# Patient Record
Sex: FEMALE | Race: OTHER | Employment: UNEMPLOYED | URBAN - METROPOLITAN AREA
[De-identification: names, ages, dates, MRNs, and addresses within clinical notes are randomized per-mention and may not be internally consistent; named-entity substitution may affect disease eponyms.]

---

## 2024-06-08 ENCOUNTER — HOSPITAL ENCOUNTER (EMERGENCY)
Facility: HOSPITAL | Age: 64
Discharge: LEFT BEFORE DISCHARGE | End: 2024-06-08
Attending: EMERGENCY MEDICINE
Payer: COMMERCIAL

## 2024-06-08 ENCOUNTER — APPOINTMENT (EMERGENCY)
Dept: RADIOLOGY | Facility: HOSPITAL | Age: 64
End: 2024-06-08
Payer: COMMERCIAL

## 2024-06-08 VITALS
SYSTOLIC BLOOD PRESSURE: 170 MMHG | HEART RATE: 83 BPM | WEIGHT: 208 LBS | OXYGEN SATURATION: 97 % | HEIGHT: 64 IN | DIASTOLIC BLOOD PRESSURE: 99 MMHG | BODY MASS INDEX: 35.51 KG/M2 | RESPIRATION RATE: 21 BRPM | TEMPERATURE: 98.7 F

## 2024-06-08 DIAGNOSIS — I47.10 SVT (SUPRAVENTRICULAR TACHYCARDIA): Primary | ICD-10-CM

## 2024-06-08 DIAGNOSIS — J02.9 PHARYNGITIS: ICD-10-CM

## 2024-06-08 LAB
2HR DELTA HS TROPONIN: 103 NG/L
ANION GAP SERPL CALCULATED.3IONS-SCNC: 10 MMOL/L (ref 4–13)
ATRIAL RATE: 214 BPM
ATRIAL RATE: 84 BPM
ATRIAL RATE: 89 BPM
ATRIAL RATE: 98 BPM
BASOPHILS # BLD AUTO: 0.02 THOUSANDS/ÂΜL (ref 0–0.1)
BASOPHILS NFR BLD AUTO: 0 % (ref 0–1)
BUN SERPL-MCNC: 15 MG/DL (ref 5–25)
CALCIUM SERPL-MCNC: 8.5 MG/DL (ref 8.4–10.2)
CARDIAC TROPONIN I PNL SERPL HS: 127 NG/L
CARDIAC TROPONIN I PNL SERPL HS: 24 NG/L
CHLORIDE SERPL-SCNC: 108 MMOL/L (ref 96–108)
CO2 SERPL-SCNC: 22 MMOL/L (ref 21–32)
CREAT SERPL-MCNC: 0.52 MG/DL (ref 0.6–1.3)
EOSINOPHIL # BLD AUTO: 0.2 THOUSAND/ÂΜL (ref 0–0.61)
EOSINOPHIL NFR BLD AUTO: 2 % (ref 0–6)
ERYTHROCYTE [DISTWIDTH] IN BLOOD BY AUTOMATED COUNT: 13.6 % (ref 11.6–15.1)
GFR SERPL CREATININE-BSD FRML MDRD: 101 ML/MIN/1.73SQ M
GLUCOSE SERPL-MCNC: 148 MG/DL (ref 65–140)
HCT VFR BLD AUTO: 44 % (ref 34.8–46.1)
HGB BLD-MCNC: 14.5 G/DL (ref 11.5–15.4)
IMM GRANULOCYTES # BLD AUTO: 0.02 THOUSAND/UL (ref 0–0.2)
IMM GRANULOCYTES NFR BLD AUTO: 0 % (ref 0–2)
LYMPHOCYTES # BLD AUTO: 2.51 THOUSANDS/ÂΜL (ref 0.6–4.47)
LYMPHOCYTES NFR BLD AUTO: 28 % (ref 14–44)
MCH RBC QN AUTO: 27.5 PG (ref 26.8–34.3)
MCHC RBC AUTO-ENTMCNC: 33 G/DL (ref 31.4–37.4)
MCV RBC AUTO: 84 FL (ref 82–98)
MONOCYTES # BLD AUTO: 0.57 THOUSAND/ÂΜL (ref 0.17–1.22)
MONOCYTES NFR BLD AUTO: 6 % (ref 4–12)
NEUTROPHILS # BLD AUTO: 5.66 THOUSANDS/ÂΜL (ref 1.85–7.62)
NEUTS SEG NFR BLD AUTO: 64 % (ref 43–75)
NRBC BLD AUTO-RTO: 0 /100 WBCS
P AXIS: 63 DEGREES
P AXIS: 63 DEGREES
P AXIS: 75 DEGREES
PLATELET # BLD AUTO: 253 THOUSANDS/UL (ref 149–390)
PMV BLD AUTO: 9.7 FL (ref 8.9–12.7)
POTASSIUM SERPL-SCNC: 3.8 MMOL/L (ref 3.5–5.3)
PR INTERVAL: 154 MS
PR INTERVAL: 174 MS
PR INTERVAL: 188 MS
QRS AXIS: -33 DEGREES
QRS AXIS: 10 DEGREES
QRS AXIS: 47 DEGREES
QRS AXIS: 5 DEGREES
QRSD INTERVAL: 72 MS
QRSD INTERVAL: 76 MS
QRSD INTERVAL: 76 MS
QRSD INTERVAL: 80 MS
QT INTERVAL: 260 MS
QT INTERVAL: 302 MS
QT INTERVAL: 376 MS
QT INTERVAL: 376 MS
QTC INTERVAL: 385 MS
QTC INTERVAL: 444 MS
QTC INTERVAL: 455 MS
QTC INTERVAL: 457 MS
RBC # BLD AUTO: 5.27 MILLION/UL (ref 3.81–5.12)
S PYO DNA THROAT QL NAA+PROBE: NOT DETECTED
SODIUM SERPL-SCNC: 140 MMOL/L (ref 135–147)
T WAVE AXIS: 146 DEGREES
T WAVE AXIS: 64 DEGREES
T WAVE AXIS: 67 DEGREES
T WAVE AXIS: 80 DEGREES
VENTRICULAR RATE: 184 BPM
VENTRICULAR RATE: 84 BPM
VENTRICULAR RATE: 89 BPM
VENTRICULAR RATE: 98 BPM
WBC # BLD AUTO: 8.98 THOUSAND/UL (ref 4.31–10.16)

## 2024-06-08 PROCEDURE — 36415 COLL VENOUS BLD VENIPUNCTURE: CPT | Performed by: EMERGENCY MEDICINE

## 2024-06-08 PROCEDURE — 87651 STREP A DNA AMP PROBE: CPT | Performed by: EMERGENCY MEDICINE

## 2024-06-08 PROCEDURE — 99285 EMERGENCY DEPT VISIT HI MDM: CPT

## 2024-06-08 PROCEDURE — 93005 ELECTROCARDIOGRAM TRACING: CPT

## 2024-06-08 PROCEDURE — 99291 CRITICAL CARE FIRST HOUR: CPT | Performed by: EMERGENCY MEDICINE

## 2024-06-08 PROCEDURE — 85025 COMPLETE CBC W/AUTO DIFF WBC: CPT | Performed by: EMERGENCY MEDICINE

## 2024-06-08 PROCEDURE — 80048 BASIC METABOLIC PNL TOTAL CA: CPT | Performed by: EMERGENCY MEDICINE

## 2024-06-08 PROCEDURE — 71045 X-RAY EXAM CHEST 1 VIEW: CPT

## 2024-06-08 PROCEDURE — 84484 ASSAY OF TROPONIN QUANT: CPT | Performed by: EMERGENCY MEDICINE

## 2024-06-08 PROCEDURE — 96374 THER/PROPH/DIAG INJ IV PUSH: CPT

## 2024-06-08 RX ORDER — ADENOSINE 3 MG/ML
6 INJECTION INTRAVENOUS ONCE
Status: COMPLETED | OUTPATIENT
Start: 2024-06-08 | End: 2024-06-08

## 2024-06-08 RX ADMIN — DEXAMETHASONE SODIUM PHOSPHATE 10 MG: 10 INJECTION, SOLUTION INTRAMUSCULAR; INTRAVENOUS at 21:19

## 2024-06-08 RX ADMIN — ADENOSINE 6 MG: 3 INJECTION INTRAVENOUS at 19:39

## 2024-06-09 NOTE — DISCHARGE INSTRUCTIONS
Delfino presented to my emergency department with SVT.  Vagal maneuvers did not work to correct the SVT.  She had been in SVT for approximately 17 hours before presenting to the emergency department.    We were able to reverse her SVT with adenosine 6 mg.    Her initial troponin was 24, a repeat troponin was 127.  The rest of her lab work was normal.  Patient was advised to stay in the hospital for cardiac evaluation, repeat troponins however needed to leave AGAINST MEDICAL ADVICE to take care of a family member.    She was advised to proceed directly to a hospital if she has repeat palpitations, chest pain, or any cardiac symptoms.

## 2024-06-09 NOTE — ED PROVIDER NOTES
"History  Chief Complaint   Patient presents with    Palpitations     Pt arrived via wheelchair c/o waking up at 3 am with \" very fast heart beat.\" Palpitations resolved this morning at 7am and returned at 1730 while sitting in the car. Pt reports sob  -n/v -dizziness     63-year-old female presents with palpitations, she woke up at 3:00 this morning (17 hours ago) with palpitations and her heart beating fast.  This has happened to her multiple times in the past but usually self resolves, she has on occasion had to go to the hospital for medication to control her heart rate.    She denies chest pain, states she feels palpitations, feels fatigued, denies shortness of breath.    Presents in SVT, resistant to vagal movements.      Palpitations  Associated symptoms: no back pain, no chest pain, no dizziness, no nausea, no shortness of breath and no vomiting        None       Past Medical History:   Diagnosis Date    Diabetes mellitus (HCC)        No past surgical history on file.    No family history on file.  I have reviewed and agree with the history as documented.    E-Cigarette/Vaping     E-Cigarette/Vaping Substances          Review of Systems   Constitutional:  Positive for fatigue. Negative for chills and fever.   HENT:  Positive for sore throat. Negative for congestion and rhinorrhea.    Respiratory:  Negative for chest tightness and shortness of breath.    Cardiovascular:  Positive for palpitations. Negative for chest pain and leg swelling.   Gastrointestinal:  Negative for abdominal pain, nausea and vomiting.   Musculoskeletal:  Negative for back pain and neck pain.   Skin:  Negative for wound.   Neurological:  Negative for dizziness and headaches.       Physical Exam  Physical Exam  Vitals reviewed.   Constitutional:       General: She is not in acute distress.     Appearance: She is well-developed. She is not diaphoretic.   HENT:      Head: Normocephalic and atraumatic.      Mouth/Throat:      Mouth: Mucous " membranes are moist.      Pharynx: No oropharyngeal exudate or posterior oropharyngeal erythema.   Eyes:      General: No scleral icterus.        Right eye: No discharge.         Left eye: No discharge.      Extraocular Movements: EOM normal.      Conjunctiva/sclera: Conjunctivae normal.      Pupils: Pupils are equal, round, and reactive to light.   Neck:      Vascular: No JVD.   Cardiovascular:      Rate and Rhythm: Regular rhythm. Tachycardia present.   Pulmonary:      Effort: Pulmonary effort is normal. No respiratory distress.      Breath sounds: Normal breath sounds. No wheezing or rales.   Chest:      Chest wall: No tenderness.   Abdominal:      General: Bowel sounds are normal. There is no distension.      Palpations: Abdomen is soft.      Tenderness: There is no abdominal tenderness. There is no guarding.   Musculoskeletal:         General: No tenderness, deformity or edema. Normal range of motion.      Cervical back: Normal range of motion and neck supple.      Right lower leg: No edema.      Left lower leg: No edema.   Skin:     General: Skin is warm and dry.      Coloration: Skin is not pale.      Findings: No erythema or rash.   Neurological:      General: No focal deficit present.      Mental Status: She is alert and oriented to person, place, and time.      Cranial Nerves: No cranial nerve deficit.   Psychiatric:         Mood and Affect: Mood and affect normal.         Behavior: Behavior normal.         Vital Signs  ED Triage Vitals [06/08/24 1921]   Temperature Pulse Respirations Blood Pressure SpO2   98.7 °F (37.1 °C) (!) 188 19 141/99 98 %      Temp Source Heart Rate Source Patient Position - Orthostatic VS BP Location FiO2 (%)   Temporal Monitor Sitting Left arm --      Pain Score       --           Vitals:    06/08/24 1921 06/08/24 1941 06/08/24 2000 06/08/24 2100   BP: 141/99 (!) 171/96 151/87 163/96   Pulse: (!) 188 (!) 108 91 87   Patient Position - Orthostatic VS: Sitting Lying Sitting Sitting          Visual Acuity      ED Medications  Medications   adenosine (ADENOCARD) injection 6 mg (6 mg Intravenous Given 6/8/24 1939)   dexamethasone oral liquid 10 mg 1 mL (10 mg Oral Given 6/8/24 2119)       Diagnostic Studies  Results Reviewed       Procedure Component Value Units Date/Time    HS Troponin I 2hr [[id number]] Collected: 06/08/24 2121    Lab Status: In process Specimen: Blood from Arm, Left Updated: 06/08/24 2125    Strep A PCR [[id number]] Collected: 06/08/24 2118    Lab Status: In process Specimen: Throat Updated: 06/08/24 2122    Basic metabolic panel [[id number]]  (Abnormal) Collected: 06/08/24 2011    Lab Status: Final result Specimen: Blood from Arm, Left Updated: 06/08/24 2043     Sodium 140 mmol/L      Potassium 3.8 mmol/L      Chloride 108 mmol/L      CO2 22 mmol/L      ANION GAP 10 mmol/L      BUN 15 mg/dL      Creatinine 0.52 mg/dL      Glucose 148 mg/dL      Calcium 8.5 mg/dL      eGFR 101 ml/min/1.73sq m     Narrative:      [employer] guidelines for Chronic Kidney Disease (CKD):     Stage 1 with normal or high GFR (GFR > 90 mL/min/1.73 square meters)    Stage 2 Mild CKD (GFR = 60-89 mL/min/1.73 square meters)    Stage 3A Moderate CKD (GFR = 45-59 mL/min/1.73 square meters)    Stage 3B Moderate CKD (GFR = 30-44 mL/min/1.73 square meters)    Stage 4 Severe CKD (GFR = 15-29 mL/min/1.73 square meters)    Stage 5 End Stage CKD (GFR <15 mL/min/1.73 square meters)  Note: GFR calculation is accurate only with a steady state creatinine    HS Troponin 0hr (reflex protocol) [[id number]]  (Normal) Collected: 06/08/24 1936    Lab Status: Final result Specimen: Blood from Arm, Left Updated: 06/08/24 2007     hs TnI 0hr 24 ng/L     HS Troponin I 4hr [[id number]]     Lab Status: No result Specimen: Blood     CBC and differential [[id number]]  (Abnormal) Collected: 06/08/24 1936    Lab Status: Final result Specimen: Blood from Arm, Left Updated: 06/08/24 1941     WBC 8.98 Thousand/uL       RBC 5.27 Million/uL      Hemoglobin 14.5 g/dL      Hematocrit 44.0 %      MCV 84 fL      MCH 27.5 pg      MCHC 33.0 g/dL      RDW 13.6 %      MPV 9.7 fL      Platelets 253 Thousands/uL      nRBC 0 /100 WBCs      Segmented % 64 %      Immature Grans % 0 %      Lymphocytes % 28 %      Monocytes % 6 %      Eosinophils Relative 2 %      Basophils Relative 0 %      Absolute Neutrophils 5.66 Thousands/µL      Absolute Immature Grans 0.02 Thousand/uL      Absolute Lymphocytes 2.51 Thousands/µL      Absolute Monocytes 0.57 Thousand/µL      Eosinophils Absolute 0.20 Thousand/µL      Basophils Absolute 0.02 Thousands/µL                    X-ray chest 1 view portable   ED Interpretation by Kristina Melendez DO (06/08 2135)   No acute cardiopulmonary abnormality                 Procedures  CriticalCare Time    Date/Time: 6/8/2024 9:37 PM    Performed by: Kristina Melendez DO  Authorized by: Kristina Melendez DO    Critical care provider statement:     Critical care time (minutes):  45    Critical care time was exclusive of:  Separately billable procedures and treating other patients and teaching time    Critical care was necessary to treat or prevent imminent or life-threatening deterioration of the following conditions:  Cardiac failure    Critical care was time spent personally by me on the following activities:  Obtaining history from patient or surrogate, development of treatment plan with patient or surrogate, discussions with consultants, evaluation of patient's response to treatment, examination of patient, interpretation of cardiac output measurements, ordering and performing treatments and interventions, ordering and review of laboratory studies, ordering and review of radiographic studies and re-evaluation of patient's condition  Comments:      SVT           ED Course  ED Course as of 06/08/24 2138   Sat Jun 08, 2024 2135 Patient feeling improved, complaining of sore throat.  Will give Decadron  oral, repeat troponin and EKG pending.                                             Medical Decision Making  63-year-old female with SVT.  Alleviated with adenosine.  Cardiac workup performed, will do delta troponin, delta EKG.    Also complaining of sore throat, strep swab and Decadron    Amount and/or Complexity of Data Reviewed  Labs: ordered.  Radiology: ordered and independent interpretation performed.    Risk  Prescription drug management.             Disposition  Final diagnoses:   SVT (supraventricular tachycardia)   Pharyngitis     Time reflects when diagnosis was documented in both MDM as applicable and the Disposition within this note       Time User Action Codes Description Comment    6/8/2024  9:38 PM Kristina Melendez Add [I47.10] SVT (supraventricular tachycardia)     6/8/2024  9:38 PM Kristina Melendez Add [J02.9] Pharyngitis           ED Disposition       None          Follow-up Information    None         Patient's Medications    No medications on file       No discharge procedures on file.    PDMP Review       None            ED Provider  Electronically Signed by           "Yury  Admitted: 6/8/2024  7:31 PM  Attending Provider: Kristina Melendez, *    Delfino Cotton or her authorized caregiver has made the decision for the patient to leave the emergency department against the advice o f her attending physician. She or her authorized caregiver has been informed and understands the inherent risks, including death, repeat condition.  She or her authorized caregiver has decided to accept the responsibility for this decision. Delfino meeks and all necessary parties have been advised that she may return for further evaluation or treatment. Her condition at time of discharge was stable.  Delfino Cotton had current vital signs as follows:  /99 (BP Location: Right arm)   Pulse 83    Temp 98.7 °F (37.1 °C) (Temporal)   Resp 21   Ht 5' 4\" (1.626 m)   Wt 94.3 kg (208 lb)                Follow-up Information       Follow up With Specialties Details Why Contact Info Additional Information    [facility] Emergency Medicine  If symptoms worsen [address] [facility] [id number] [phone] [facility], [address], [place], [address]    Follow-up with your primary care doctor soon as possible.  Follow-up with a cardiology team as well.                 There are no discharge medications for this patient.      No discharge procedures on file.    PDMP Review       None            ED Provider  Electronically Signed by             Kristina Melendez DO  06/17/24 1957    "

## 2024-06-10 LAB
ATRIAL RATE: 214 BPM
ATRIAL RATE: 84 BPM
ATRIAL RATE: 89 BPM
ATRIAL RATE: 98 BPM
P AXIS: 63 DEGREES
P AXIS: 63 DEGREES
P AXIS: 75 DEGREES
PR INTERVAL: 154 MS
PR INTERVAL: 174 MS
PR INTERVAL: 188 MS
QRS AXIS: -33 DEGREES
QRS AXIS: 10 DEGREES
QRS AXIS: 47 DEGREES
QRS AXIS: 5 DEGREES
QRSD INTERVAL: 72 MS
QRSD INTERVAL: 76 MS
QRSD INTERVAL: 76 MS
QRSD INTERVAL: 80 MS
QT INTERVAL: 260 MS
QT INTERVAL: 302 MS
QT INTERVAL: 376 MS
QT INTERVAL: 376 MS
QTC INTERVAL: 385 MS
QTC INTERVAL: 444 MS
QTC INTERVAL: 455 MS
QTC INTERVAL: 457 MS
T WAVE AXIS: 146 DEGREES
T WAVE AXIS: 64 DEGREES
T WAVE AXIS: 67 DEGREES
T WAVE AXIS: 80 DEGREES
VENTRICULAR RATE: 184 BPM
VENTRICULAR RATE: 84 BPM
VENTRICULAR RATE: 89 BPM
VENTRICULAR RATE: 98 BPM

## 2024-06-10 PROCEDURE — 93010 ELECTROCARDIOGRAM REPORT: CPT | Performed by: INTERNAL MEDICINE
